# Patient Record
Sex: MALE | Race: WHITE | NOT HISPANIC OR LATINO | Employment: FULL TIME | URBAN - METROPOLITAN AREA
[De-identification: names, ages, dates, MRNs, and addresses within clinical notes are randomized per-mention and may not be internally consistent; named-entity substitution may affect disease eponyms.]

---
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
## 2021-05-14 ENCOUNTER — IMMUNIZATION (OUTPATIENT)
Dept: LAB | Facility: CLINIC | Age: 21
End: 2021-05-14
Payer: COMMERCIAL

## 2022-06-30 ENCOUNTER — APPOINTMENT (OUTPATIENT)
Dept: GENERAL RADIOLOGY | Facility: CLINIC | Age: 22
End: 2022-06-30
Attending: PHYSICIAN ASSISTANT
Payer: OTHER MISCELLANEOUS

## 2022-06-30 ENCOUNTER — HOSPITAL ENCOUNTER (EMERGENCY)
Facility: CLINIC | Age: 22
Discharge: HOME | End: 2022-06-30
Attending: PHYSICIAN ASSISTANT | Admitting: PHYSICIAN ASSISTANT
Payer: OTHER MISCELLANEOUS

## 2022-06-30 VITALS
DIASTOLIC BLOOD PRESSURE: 80 MMHG | HEIGHT: 74 IN | OXYGEN SATURATION: 100 % | HEART RATE: 84 BPM | TEMPERATURE: 98.3 F | RESPIRATION RATE: 16 BRPM | SYSTOLIC BLOOD PRESSURE: 140 MMHG | WEIGHT: 210 LBS | BODY MASS INDEX: 26.95 KG/M2

## 2022-06-30 DIAGNOSIS — S91.331A PUNCTURE WOUND OF RIGHT FOOT, INITIAL ENCOUNTER: ICD-10-CM

## 2022-06-30 PROCEDURE — 99214 OFFICE O/P EST MOD 30 MIN: CPT | Performed by: PHYSICIAN ASSISTANT

## 2022-06-30 PROCEDURE — 250N000011 HC RX IP 250 OP 636: Performed by: PHYSICIAN ASSISTANT

## 2022-06-30 PROCEDURE — 73630 X-RAY EXAM OF FOOT: CPT | Mod: RT

## 2022-06-30 PROCEDURE — G0463 HOSPITAL OUTPT CLINIC VISIT: HCPCS | Mod: 25 | Performed by: PHYSICIAN ASSISTANT

## 2022-06-30 PROCEDURE — 90471 IMMUNIZATION ADMIN: CPT | Performed by: PHYSICIAN ASSISTANT

## 2022-06-30 PROCEDURE — 90715 TDAP VACCINE 7 YRS/> IM: CPT | Performed by: PHYSICIAN ASSISTANT

## 2022-06-30 RX ORDER — CLINDAMYCIN PHOSPHATE 10 UG/ML
LOTION TOPICAL
COMMUNITY
Start: 2022-04-29

## 2022-06-30 RX ORDER — CIPROFLOXACIN 500 MG/1
500 TABLET, FILM COATED ORAL 2 TIMES DAILY
Qty: 6 TABLET | Refills: 0 | Status: SHIPPED | OUTPATIENT
Start: 2022-06-30 | End: 2022-07-03

## 2022-06-30 RX ORDER — CEPHALEXIN 500 MG/1
500 CAPSULE ORAL 3 TIMES DAILY
Qty: 9 CAPSULE | Refills: 0 | Status: SHIPPED | OUTPATIENT
Start: 2022-06-30 | End: 2022-07-03

## 2022-06-30 RX ADMIN — CLOSTRIDIUM TETANI TOXOID ANTIGEN (FORMALDEHYDE INACTIVATED), CORYNEBACTERIUM DIPHTHERIAE TOXOID ANTIGEN (FORMALDEHYDE INACTIVATED), BORDETELLA PERTUSSIS TOXOID ANTIGEN (GLUTARALDEHYDE INACTIVATED), BORDETELLA PERTUSSIS FILAMENTOUS HEMAGGLUTININ ANTIGEN (FORMALDEHYDE INACTIVATED), BORDETELLA PERTUSSIS PERTACTIN ANTIGEN, AND BORDETELLA PERTUSSIS FIMBRIAE 2/3 ANTIGEN 0.5 ML: 5; 2; 2.5; 5; 3; 5 INJECTION, SUSPENSION INTRAMUSCULAR at 19:30

## 2022-06-30 ASSESSMENT — ENCOUNTER SYMPTOMS: CONSTITUTIONAL NEGATIVE: 1

## 2022-07-01 NOTE — ED PROVIDER NOTES
History     Chief Complaint   Patient presents with     Puncture Wound     HPI  Thony Cifuentes is a 21 year old male who presents with complaints of puncture wound to bottom of right foot.  Patient states he was at work when he stepped on an old piotr nail that went through his intact work shoe.  He states the nail went into the bottom of his foot causing a puncture wound and causing it to bleed.  He has had pain and difficulties putting weight on the foot since that time.  Patient is otherwise healthy.  Denies history of diabetes mellitus.  Pt last tetanus was in 2013.      Allergies:  Allergies   Allergen Reactions     No Known Drug Allergies        Problem List:    Patient Active Problem List    Diagnosis Date Noted     Esotropia 04/29/2005     Priority: Medium     s/p surgery, wears glasses  Problem list name updated by automated process. Provider to review       Simple chronic serous otitis media 04/29/2005     Priority: Medium     s/p pe tubes  Problem list name updated by automated process. Provider to review          Past Medical History:    Past Medical History:   Diagnosis Date     Esotropia, unspecified      Simple or unspecified chronic serous otitis media        Past Surgical History:    Past Surgical History:   Procedure Laterality Date     C AFF EYE MUSCLE SURG PROC UNLISTED  3/2004    bilateral medial rectus resection     HC CREATE EARDRUM OPENING,GEN ANESTH  12/2004    P.E. Tubes     HC REMOVAL ADENOIDS,PRIMARY,<13 Y/O  12/2004       Family History:    Family History   Problem Relation Age of Onset     Hypertension Mother      Diabetes Maternal Grandmother      Cerebrovascular Disease Maternal Grandfather      Hypertension Maternal Grandfather      Diabetes Paternal Grandmother      Cancer Paternal Grandfather         lung       Social History:  Marital Status:  Single [1]  Social History     Tobacco Use     Smoking status: Never Smoker     Smokeless tobacco: Never Used   Substance Use Topics      "Alcohol use: Yes     Comment: occasional     Drug use: No        Medications:    cephALEXin (KEFLEX) 500 MG capsule  ciprofloxacin (CIPRO) 500 MG tablet  clindamycin (CLEOCIN T) 1 % external lotion          Review of Systems   Constitutional: Negative.    Skin:        Puncture wound to bottom of right foot   All other systems reviewed and are negative.      Physical Exam   BP (!) 140/80   Pulse 84   Temp 98.3  F (36.8  C) (Tympanic)   Resp 16   Ht 1.88 m (6' 2\")   Wt 95.3 kg (210 lb)   SpO2 100%   BMI 26.96 kg/m        Physical Exam  Constitutional:       General: He is not in acute distress.     Appearance: Normal appearance. He is well-developed. He is not ill-appearing, toxic-appearing or diaphoretic.   HENT:      Head: Normocephalic and atraumatic.   Eyes:      Conjunctiva/sclera: Conjunctivae normal.   Cardiovascular:      Pulses: Normal pulses.   Pulmonary:      Effort: Pulmonary effort is normal.   Musculoskeletal:         General: Normal range of motion.      Cervical back: Neck supple.      Comments: Puncture wound to plantar aspect of right foot over the ball of the foot.  No surrounding erythema or swelling.  Area is tender to palpation.  Full range of motion of toes.   Skin:     General: Skin is warm and dry.      Capillary Refill: Capillary refill takes less than 2 seconds.   Neurological:      General: No focal deficit present.      Mental Status: He is alert.      Sensory: Sensation is intact.      Motor: Motor function is intact.         ED Course                 Procedures    Results for orders placed or performed during the hospital encounter of 06/30/22 (from the past 24 hour(s))   Foot  XR, G/E 3 views, right    Narrative    EXAM: XR FOOT RIGHT G/E 3 VIEWS  LOCATION: [facility] [place]  DATE/TIME: 6/30/2022 7:26 PM    INDICATION: Puncture wound to ball of foot with piotr nail through shoe.  COMPARISON: None.      Impression    IMPRESSION: Anatomic alignment. No acute " displaced fracture. No significant joint space narrowing. No localizing soft tissue swelling. No radiopaque soft tissue foreign body identified.       Medications   Tdap (tetanus-diphtheria-acell pertussis) (ADACEL) injection 0.5 mL (0.5 mLs Intramuscular Given 6/30/22 1930)       Assessments & Plan (with Medical Decision Making)     Pt is a 21 year old male who presents with complaints of puncture wound to bottom of right foot.  Patient states he was at work when he stepped on an old piotr nail that went through his intact work shoe.  He states the nail went into the bottom of his foot causing a puncture wound and causing it to bleed.  He has had pain and difficulties putting weight on the foot since that time.  Patient is otherwise healthy.  Denies history of diabetes mellitus.  Pt last tetanus was in 2013.    Pt is afebrile on arrival.  Exam as above.  Tetanus was updated today.  Right foot x-ray was negative for foreign body or fracture.  Discussed results with patient.  Discussed risk versus benefits of prophylactic antibiotics versus continued monitoring of puncture wound site.  Patient and his mother prefer to start prophylactic antibiotics as this went through his shoe into his foot and it was a dirty/piotr nail.  We discussed that Ciprofloxacin carries the risk of tendonitis and tendon rupture.  He was instructed to discontinue the medication and avoid exercise and use of the affected area at the first sign of tendon pain, swelling, or inflammation and contact PCP.  He expresses understanding of this and agreement with the plan.  Instructed patient to continue monitoring the wound for signs of infection.  Return precautions were reviewed.  Hand-outs were provided.    Patient was sent with Keflex and Cipro x3 days and was instructed to follow-up with PCP in 3-5 days for continued care and management or sooner if new or worsening symptoms.  He is to return to the ED for persistent and/or worsening symptoms.   Patient expressed understanding of the diagnosis and plan and was discharged home in good condition.    I have reviewed the nursing notes.    I have reviewed the findings, diagnosis, plan and need for follow up with the patient.    Discharge Medication List as of 6/30/2022  7:53 PM      START taking these medications    Details   cephALEXin (KEFLEX) 500 MG capsule Take 1 capsule (500 mg) by mouth 3 times daily for 3 days, Disp-9 capsule, R-0, E-Prescribe      ciprofloxacin (CIPRO) 500 MG tablet Take 1 tablet (500 mg) by mouth 2 times daily for 3 days, Disp-6 tablet, R-0, E-Prescribe             Final diagnoses:   Puncture wound of right foot, initial encounter       6/30/2022   [facility] EMERGENCY DEPT      Disclaimer:  This note consists of symbols derived from keyboarding, dictation and/or voice recognition software.  As a result, there may be errors in the script that have gone undetected.  Please consider this when interpreting information found in this chart.     Martha Castillo PA-C  06/30/22 2120       Martha Castillo PA-C  06/30/22 2120

## 2022-07-01 NOTE — ED TRIAGE NOTES
Puncture would to right, bottom of foot.  Patient stepped on a nail with shoe on while at work today.  Patient's last Tetanus was 2013.     Triage Assessment     Row Name 06/30/22 1900       Triage Assessment (Adult)    Airway WDL WDL       Respiratory WDL    Respiratory WDL WDL       Skin Circulation/Temperature WDL    Skin Circulation/Temperature WDL WDL       Cardiac WDL    Cardiac WDL WDL       Peripheral/Neurovascular WDL    Peripheral Neurovascular WDL WDL       Cognitive/Neuro/Behavioral WDL    Cognitive/Neuro/Behavioral WDL WDL

## 2025-08-18 ENCOUNTER — OFFICE VISIT (OUTPATIENT)
Dept: FAMILY MEDICINE | Facility: CLINIC | Age: 25
End: 2025-08-18
Payer: COMMERCIAL

## 2025-08-18 ENCOUNTER — ANCILLARY PROCEDURE (OUTPATIENT)
Dept: GENERAL RADIOLOGY | Facility: CLINIC | Age: 25
End: 2025-08-18
Attending: FAMILY MEDICINE
Payer: COMMERCIAL

## 2025-08-18 VITALS
SYSTOLIC BLOOD PRESSURE: 120 MMHG | DIASTOLIC BLOOD PRESSURE: 70 MMHG | BODY MASS INDEX: 28.75 KG/M2 | HEIGHT: 74 IN | WEIGHT: 224 LBS | OXYGEN SATURATION: 99 % | HEART RATE: 54 BPM | TEMPERATURE: 97.9 F | RESPIRATION RATE: 16 BRPM

## 2025-08-18 DIAGNOSIS — S99.912A ANKLE INJURY, LEFT, INITIAL ENCOUNTER: ICD-10-CM

## 2025-08-18 DIAGNOSIS — S99.912A ANKLE INJURY, LEFT, INITIAL ENCOUNTER: Primary | ICD-10-CM

## 2025-08-18 DIAGNOSIS — S82.892A CLOSED FRACTURE OF LEFT ANKLE, INITIAL ENCOUNTER: ICD-10-CM

## 2025-08-18 PROCEDURE — 3078F DIAST BP <80 MM HG: CPT | Performed by: FAMILY MEDICINE

## 2025-08-18 PROCEDURE — 1125F AMNT PAIN NOTED PAIN PRSNT: CPT | Performed by: FAMILY MEDICINE

## 2025-08-18 PROCEDURE — 99203 OFFICE O/P NEW LOW 30 MIN: CPT | Performed by: FAMILY MEDICINE

## 2025-08-18 PROCEDURE — 3074F SYST BP LT 130 MM HG: CPT | Performed by: FAMILY MEDICINE

## 2025-08-18 PROCEDURE — 73610 X-RAY EXAM OF ANKLE: CPT | Mod: TC | Performed by: RADIOLOGY

## 2025-08-18 RX ORDER — NAPROXEN 500 MG/1
500 TABLET ORAL 2 TIMES DAILY WITH MEALS
Qty: 14 TABLET | Refills: 0 | Status: SHIPPED | OUTPATIENT
Start: 2025-08-18 | End: 2025-08-25

## 2025-08-18 ASSESSMENT — PAIN SCALES - GENERAL: PAINLEVEL_OUTOF10: SEVERE PAIN (8)
